# Patient Record
Sex: MALE | Race: WHITE | URBAN - NONMETROPOLITAN AREA
[De-identification: names, ages, dates, MRNs, and addresses within clinical notes are randomized per-mention and may not be internally consistent; named-entity substitution may affect disease eponyms.]

---

## 2019-05-16 ENCOUNTER — NEW PATIENT (OUTPATIENT)
Dept: URBAN - NONMETROPOLITAN AREA CLINIC 6 | Facility: CLINIC | Age: 74
End: 2019-05-16
Payer: COMMERCIAL

## 2019-05-16 PROCEDURE — 99204 OFFICE O/P NEW MOD 45 MIN: CPT | Performed by: OPHTHALMOLOGY

## 2019-05-16 ASSESSMENT — INTRAOCULAR PRESSURE
OD: 11
OS: 11

## 2019-05-16 ASSESSMENT — KERATOMETRY
OS: 43.49
OD: 42.48

## 2019-05-16 ASSESSMENT — VISUAL ACUITY
OS: 20/40
OD: 20/25

## 2019-06-18 ENCOUNTER — Encounter (OUTPATIENT)
Dept: URBAN - NONMETROPOLITAN AREA CLINIC 6 | Facility: CLINIC | Age: 74
End: 2019-06-18
Payer: COMMERCIAL

## 2019-06-18 PROCEDURE — 99213 OFFICE O/P EST LOW 20 MIN: CPT | Performed by: PHYSICIAN ASSISTANT

## 2019-08-29 ENCOUNTER — FOLLOW UP ESTABLISHED (OUTPATIENT)
Dept: URBAN - NONMETROPOLITAN AREA CLINIC 6 | Facility: CLINIC | Age: 74
End: 2019-08-29
Payer: COMMERCIAL

## 2019-08-29 DIAGNOSIS — Z96.1 PRESENCE OF INTRAOCULAR LENS: ICD-10-CM

## 2019-08-29 DIAGNOSIS — H26.493 OTHER SECONDARY CATARACT, BILATERAL: Primary | ICD-10-CM

## 2019-08-29 PROCEDURE — 99213 OFFICE O/P EST LOW 20 MIN: CPT | Performed by: OPHTHALMOLOGY

## 2019-08-29 ASSESSMENT — VISUAL ACUITY
OD: 20/25
OS: 20/25

## 2019-08-29 ASSESSMENT — KERATOMETRY
OS: 42.90
OD: 42.53

## 2019-08-29 ASSESSMENT — INTRAOCULAR PRESSURE
OS: 14
OD: 14

## 2019-09-25 ENCOUNTER — Encounter (OUTPATIENT)
Dept: URBAN - NONMETROPOLITAN AREA CLINIC 6 | Facility: CLINIC | Age: 74
End: 2019-09-25
Payer: COMMERCIAL

## 2019-09-25 DIAGNOSIS — Z01.818 ENCOUNTER FOR OTHER PREPROCEDURAL EXAMINATION: Primary | ICD-10-CM

## 2019-09-25 PROCEDURE — 99213 OFFICE O/P EST LOW 20 MIN: CPT | Performed by: PHYSICIAN ASSISTANT

## 2019-10-03 ENCOUNTER — SURGERY (OUTPATIENT)
Dept: URBAN - NONMETROPOLITAN AREA SURGERY 1 | Facility: SURGERY | Age: 74
End: 2019-10-03
Payer: COMMERCIAL

## 2019-10-03 PROCEDURE — 66821 AFTER CATARACT LASER SURGERY: CPT | Performed by: OPHTHALMOLOGY

## 2019-10-09 ENCOUNTER — SURGERY (OUTPATIENT)
Dept: URBAN - NONMETROPOLITAN AREA SURGERY 1 | Facility: SURGERY | Age: 74
End: 2019-10-09
Payer: COMMERCIAL

## 2019-10-09 PROCEDURE — 66821 AFTER CATARACT LASER SURGERY: CPT | Performed by: OPHTHALMOLOGY

## 2019-10-17 ENCOUNTER — POST OP (OUTPATIENT)
Dept: URBAN - NONMETROPOLITAN AREA CLINIC 6 | Facility: CLINIC | Age: 74
End: 2019-10-17

## 2019-10-17 PROCEDURE — 99024 POSTOP FOLLOW-UP VISIT: CPT | Performed by: OPTOMETRIST

## 2019-10-17 ASSESSMENT — INTRAOCULAR PRESSURE
OD: 14
OS: 14

## 2021-09-20 ENCOUNTER — OFFICE VISIT (OUTPATIENT)
Dept: URBAN - NONMETROPOLITAN AREA CLINIC 10 | Facility: CLINIC | Age: 76
End: 2021-09-20
Payer: COMMERCIAL

## 2021-09-20 DIAGNOSIS — H02.829 CYST OF EYELID: ICD-10-CM

## 2021-09-20 DIAGNOSIS — H52.223 REGULAR ASTIGMATISM, BILATERAL: Primary | ICD-10-CM

## 2021-09-20 DIAGNOSIS — H35.3131 NONEXUDATIVE MACULAR DEGENERATION, EARLY DRY STAGE, BILATERAL: ICD-10-CM

## 2021-09-20 PROCEDURE — 92004 COMPRE OPH EXAM NEW PT 1/>: CPT | Performed by: OPTOMETRIST

## 2021-09-20 ASSESSMENT — INTRAOCULAR PRESSURE
OS: 16
OD: 16

## 2021-09-20 ASSESSMENT — VISUAL ACUITY
OS: 20/25
OD: 20/25

## 2021-09-20 ASSESSMENT — KERATOMETRY
OD: 43.50
OS: 44.25

## 2021-09-20 NOTE — IMPRESSION/PLAN
Impression: Nonexudative macular degeneration, early dry stage, bilateral: H35.9172. Plan: Discussed diagnosis in detail with patient. Call if [address] worsens. Advised patient to stop smoking. Pt to report any changes, AREDS 2 formula recommended.

## 2021-09-20 NOTE — IMPRESSION/PLAN
Impression: Cyst of eyelid: H02.829. Plan: Discussed diagnosis in detail with patient. Advised patient to follow up with dermatologist. Patient states he has an appointment scheduled in October.